# Patient Record
Sex: FEMALE | Race: WHITE
[De-identification: names, ages, dates, MRNs, and addresses within clinical notes are randomized per-mention and may not be internally consistent; named-entity substitution may affect disease eponyms.]

---
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
## 2020-07-22 ENCOUNTER — HOSPITAL ENCOUNTER (OUTPATIENT)
Dept: HOSPITAL 39 - US | Age: 20
End: 2020-07-22
Attending: FAMILY MEDICINE
Payer: COMMERCIAL

## 2020-07-22 DIAGNOSIS — E04.1: ICD-10-CM

## 2020-07-22 DIAGNOSIS — R94.6: Primary | ICD-10-CM

## 2020-07-22 NOTE — US
US THYROID



CLINICAL STATEMENT:19 years Female ABNORMAL RESULTS OF THYROID

FUNCTION STUDIES. . No palpable mass. No previous thyroid surgery

or medical therapy.



COMPARISON: None



TECHNIQUE: Transcutaneous scanning, grayscale and Doppler modes. 



FINDINGS:

Size right thyroid lobe: 4.4 x 1.7 x 1.4 cm

Size left thyroid lobe: 4.7 x 1.2 x 1.1 cm

Size isthmus: 0.21 cm



Estimated total number of nodules greater than or equal to 1 cm:

1.. No distinct cyst or fluid collection. No large

calcifications. Heterogeneous echoes.





Nodule 1:

Size: 1.2 x 0.7 x 0.6 cm

Location: Right Mid

Composition: solid or almost completely solid: 2 points.

Minimally vascular.

Echogenicity: hypoechoic: 2 points

Shape: wider than tall: 0 points

Margins: smooth: 0 points

Echogenic foci: none: 0 points



ACR Total Points: 4;  ACR TI-RADS risk category: TR4 - 

moderately suspicious nodule.



Soft tissue around the thyroid gland shows no evidence of

dominant solid mass, distinct cysts, fluid collection, or large

calcifications. No overlying skin changes.





IMPRESSION:

1.  Nodule 1: ACR TI-RADS 2017 Category TR4. Recommend: 

Follow-up ultrasound in 1 year..  Recommendations based upon [facility] Best Practice recommendations and ACR TI-RADS 2017

guidelines. Please see below*. 

2.  Soft tissue around the thyroid gland is unremarkable.





*ACR TI-RADS 2017 Recommendations for imaging follow-up of

nodules:



TR1: No FNA or follow up

TR2: No FNA or follow up

TR3: FNA if >/= 2.5 cm, follow up if 1.5 - 2.4 cm in 1, 3, and 5

years

TR4: FNA if >/= 1.5 cm, follow up if 1.0 - 1.4 cm in 1, 2, 3, and

5 years

TR5: FNA if >/= 1.0 cm, follow up if 0.5 - 0.9 cm every year for

5 years



**ACR TI-RADS recommends that no more than two nodules with the

highest ACR TI-RADS total point should be biopsied and no more

than four nodules should be followed.



These recommendations do not apply to patients with increased

risk for thyroid cancer or patients with symptomatic thyroid

disease.







Electronically signed by:  Darryl Schroeder MD  7/22/2020 5:16 PM CDT

Workstation: [id number]

## 2020-11-02 ENCOUNTER — HOSPITAL ENCOUNTER (OUTPATIENT)
Dept: HOSPITAL 39 - LAB.NP | Age: 20
End: 2020-11-02
Attending: NURSE PRACTITIONER
Payer: COMMERCIAL

## 2020-11-02 DIAGNOSIS — Z30.42: Primary | ICD-10-CM

## 2020-11-24 ENCOUNTER — HOSPITAL ENCOUNTER (OUTPATIENT)
Dept: HOSPITAL 39 - GMALS | Age: 20
End: 2020-11-24
Attending: NURSE PRACTITIONER
Payer: COMMERCIAL

## 2020-11-24 DIAGNOSIS — A74.9: Primary | ICD-10-CM
